# Patient Record
Sex: MALE | Race: WHITE | NOT HISPANIC OR LATINO | Employment: FULL TIME | URBAN - METROPOLITAN AREA
[De-identification: names, ages, dates, MRNs, and addresses within clinical notes are randomized per-mention and may not be internally consistent; named-entity substitution may affect disease eponyms.]

---

## 2018-08-10 ENCOUNTER — OFFICE VISIT (OUTPATIENT)
Dept: URGENT CARE | Facility: CLINIC | Age: 23
End: 2018-08-10

## 2018-08-10 VITALS
HEIGHT: 70 IN | BODY MASS INDEX: 18.64 KG/M2 | DIASTOLIC BLOOD PRESSURE: 64 MMHG | WEIGHT: 130.2 LBS | OXYGEN SATURATION: 93 % | SYSTOLIC BLOOD PRESSURE: 122 MMHG | RESPIRATION RATE: 16 BRPM | HEART RATE: 70 BPM | TEMPERATURE: 98 F

## 2018-08-10 DIAGNOSIS — S61.401A: ICD-10-CM

## 2018-08-10 PROCEDURE — 12002 RPR S/N/AX/GEN/TRNK2.6-7.5CM: CPT | Performed by: NURSE PRACTITIONER

## 2018-08-10 ASSESSMENT — ENCOUNTER SYMPTOMS
TINGLING: 0
MYALGIAS: 0
SENSORY CHANGE: 0
FOCAL WEAKNESS: 0
CHILLS: 0
FEVER: 0
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
## 2018-08-10 NOTE — PATIENT INSTRUCTIONS
Stitches, Staples, or Adhesive Wound Closure  Doctors use stitches (sutures), staples, and certain glue (skin adhesives) to hold your skin together while it heals (wound closure). You may need this treatment after you have surgery or if you cut your skin accidentally. These methods help your skin heal more quickly. They also make it less likely that you will have a scar.  What are the different kinds of wound closures?  There are many options for wound closure. The one that your doctor uses depends on how deep and large your wound is.  Adhesive Glue   To use this glue to close a wound, your doctor holds the edges of the wound together and paints the glue on the surface of your skin. You may need more than one layer of glue. Then the wound may be covered with a light bandage (dressing).  This type of skin closure may be used for small wounds that are not deep (superficial). Using glue for wound closure is less painful than other methods. It does not require a medicine that numbs the area. This method also leaves nothing to be removed. Adhesive glue is often used for children and on facial wounds.  Adhesive glue cannot be used for wounds that are deep, uneven, or bleeding. It is not used inside of a wound.  Adhesive Strips   These strips are made of sticky (adhesive), porous paper. They are placed across your skin edges like a regular adhesive bandage. You leave them on until they fall off.  Adhesive strips may be used to close very superficial wounds. They may also be used along with sutures to improve closure of your skin edges.  Sutures   Sutures are the oldest method of wound closure. Sutures can be made from natural or synthetic materials. They can be made from a material that your body can break down as your wound heals (absorbable), or they can be made from a material that needs to be removed from your skin (nonabsorbable). They come in many different strengths and sizes.  Your doctor attaches the sutures to a  steel needle on one end. Sutures can be passed through your skin, or through the tissues beneath your skin. Then they are tied and cut. Your skin edges may be closed in one continuous stitch or in separate stitches.  Sutures are strong and can be used for all kinds of wounds. Absorbable sutures may be used to close tissues under the skin. The disadvantage of sutures is that they may cause skin reactions that lead to infection. Nonabsorbable sutures need to be removed.  Staples   When surgical staples are used to close a wound, the edges of your skin on both sides of the wound are brought close together. A staple is placed across the wound, and an instrument secures the edges together. Staples are often used to close surgical cuts (incisions).  Staples are faster to use than sutures, and they cause less reaction from your skin. Staples need to be removed using a tool that bends the staples away from your skin.  How do I care for my wound closure?  · Take medicines only as told by your doctor.  · If you were prescribed an antibiotic medicine for your wound, finish it all even if you start to feel better.  · Use ointments or creams only as told by your doctor.  · Wash your hands with soap and water before and after touching your wound.  · Do not soak your wound in water. Do not take baths, swim, or use a hot tub until your doctor says it is okay.  · Ask your doctor when you can start showering. Cover your wound if told by your doctor.  · Do not take out your own sutures or staples.  · Do not pick at your wound. Picking can cause an infection.  · Keep all follow-up visits as told by your doctor. This is important.  How long will I have my wound closure?  · Leave adhesive glue on your skin until the glue peels away.  · Leave adhesive strips on your skin until they fall off.  · Absorbable sutures will dissolve within several days.  · Nonabsorbable sutures and staples must be removed. The location of the wound will  determine how long they stay in. This can range from several days to a couple of weeks.  When should I seek help for my wound closure?  Contact your doctor if:  · You have a fever.  · You have chills.  · You have redness, puffiness (swelling), or pain at the site of your wound.  · You have fluid, blood, or pus coming from your wound.  · There is a bad smell coming from your wound.  · The skin edges of your wound start to separate after your sutures have been removed.  · Your wound becomes thick, raised, and darker in color after your sutures come out (scarring).  This information is not intended to replace advice given to you by your health care provider. Make sure you discuss any questions you have with your health care provider.  Document Released: 10/15/2010 Document Revised: 08/16/2017 Document Reviewed: 05/27/2015  Elsevier Interactive Patient Education © 2017 [employer]

## 2018-08-24 ENCOUNTER — OFFICE VISIT (OUTPATIENT)
Dept: URGENT CARE | Facility: CLINIC | Age: 23
End: 2018-08-24

## 2018-08-24 VITALS
TEMPERATURE: 98.6 F | OXYGEN SATURATION: 98 % | DIASTOLIC BLOOD PRESSURE: 76 MMHG | HEART RATE: 55 BPM | BODY MASS INDEX: 18.61 KG/M2 | HEIGHT: 70 IN | WEIGHT: 130 LBS | SYSTOLIC BLOOD PRESSURE: 122 MMHG

## 2018-08-24 DIAGNOSIS — Z48.02 VISIT FOR SUTURE REMOVAL: ICD-10-CM

## 2018-08-24 PROCEDURE — 99024 POSTOP FOLLOW-UP VISIT: CPT | Performed by: PHYSICIAN ASSISTANT

## 2018-08-24 ASSESSMENT — ENCOUNTER SYMPTOMS
ABDOMINAL PAIN: 0
ROS SKIN COMMENTS: + LACERATION
CHILLS: 0
DIZZINESS: 0
VOMITING: 0
MUSCULOSKELETAL NEGATIVE: 1
DIARRHEA: 0
NAUSEA: 0
FEVER: 0
SHORTNESS OF BREATH: 0

## 2018-08-25 NOTE — PROGRESS NOTES
"Subjective:      Gilbert Lamb is a 23 y.o. male who presents with Suture / Staple Removal (right hand)            Suture / Staple Removal   The sutures were placed 11 to 14 days ago (14 days). He tried regular soap and water washings since the wound repair. The treatment provided moderate relief. The maximum temperature noted was less than 100.4 F. There has been no drainage from the wound. There is no redness present. There is no swelling present. There is no pain present.       Review of Systems   Constitutional: Negative for chills and fever.   HENT: Negative for congestion.    Respiratory: Negative for shortness of breath.    Cardiovascular: Negative for chest pain.   Gastrointestinal: Negative for abdominal pain, diarrhea, nausea and vomiting.   Genitourinary: Negative.    Musculoskeletal: Negative.    Skin: Negative for rash.        + laceration   Neurological: Negative for dizziness.        Objective:     /76   Pulse (!) 55   Temp 37 °C (98.6 °F)   Ht 1.778 m (5' 10\")   Wt 59 kg (130 lb)   SpO2 98%   BMI 18.65 kg/m²      Physical Exam   Constitutional: He is oriented to person, place, and time. He appears well-developed and well-nourished. No distress.   HENT:   Head: Normocephalic and atraumatic.   Eyes: Pupils are equal, round, and reactive to light.   Neck: Normal range of motion.   Cardiovascular: Normal rate.    Pulmonary/Chest: Effort normal.   Musculoskeletal: Normal range of motion.   Neurological: He is alert and oriented to person, place, and time.   Skin: Skin is warm and dry. He is not diaphoretic.   Linear laceration noted on left palm with 6 interrupted sutures in place. No erythema, edema or drainage of the site.    Psychiatric: He has a normal mood and affect. His behavior is normal.   Nursing note and vitals reviewed.         PMH:  has no past medical history on file.  MEDS:   Current Outpatient Prescriptions:   •  cephALEXin (KEFLEX) 500 MG CAPS, Take 1 Cap by " mouth 4 times a day., Disp: 40 Cap, Rfl: 0  •  hydrocodone-acetaminophen (NORCO) 5-325 MG TABS per tablet, Take 1-2 Tabs by mouth every 6 hours as needed., Disp: 15 Each, Rfl: 0  ALLERGIES: No Known Allergies  SURGHX: History reviewed. No pertinent surgical history.  SOCHX:  reports that he has never smoked. He has never used smokeless tobacco.  FH: family history is not on file.       Assessment/Plan:     1. Visit for suture removal    All 6 sutures removed without evidence of infection or wound dehiscence.

## 2020-07-20 ENCOUNTER — OFFICE VISIT (OUTPATIENT)
Dept: URGENT CARE | Facility: CLINIC | Age: 25
End: 2020-07-20

## 2020-07-20 VITALS
WEIGHT: 150 LBS | BODY MASS INDEX: 21.52 KG/M2 | OXYGEN SATURATION: 97 % | HEART RATE: 60 BPM | RESPIRATION RATE: 18 BRPM | TEMPERATURE: 98.5 F | SYSTOLIC BLOOD PRESSURE: 128 MMHG | DIASTOLIC BLOOD PRESSURE: 82 MMHG

## 2020-07-20 DIAGNOSIS — R05.9 COUGH: ICD-10-CM

## 2020-07-20 PROCEDURE — 99214 OFFICE O/P EST MOD 30 MIN: CPT | Performed by: PHYSICIAN ASSISTANT

## 2020-07-20 RX ORDER — ALBUTEROL SULFATE 90 UG/1
2 AEROSOL, METERED RESPIRATORY (INHALATION) EVERY 4 HOURS PRN
Qty: 8.5 G | Refills: 0 | Status: SHIPPED | OUTPATIENT
Start: 2020-07-20

## 2020-07-20 ASSESSMENT — ENCOUNTER SYMPTOMS
FEVER: 0
CHILLS: 0
WHEEZING: 0
SPUTUM PRODUCTION: 0
PALPITATIONS: 0
COUGH: 1
SHORTNESS OF BREATH: 1
SORE THROAT: 0
HEMOPTYSIS: 0

## 2020-07-20 NOTE — PROGRESS NOTES
Subjective:   Gilbert Lamb is a 25 y.o. male who presents for Cough (x5 days dry cough)      Cough   This is a new problem. The current episode started in the past 7 days. The problem has been unchanged. The cough is non-productive. Associated symptoms include shortness of breath. Pertinent negatives include no chest pain, chills, ear pain, fever, hemoptysis, sore throat or wheezing. Nothing aggravates the symptoms. He has tried nothing for the symptoms.       Review of Systems   Constitutional: Negative for chills, fever and malaise/fatigue.   HENT: Negative for congestion, ear pain and sore throat.    Respiratory: Positive for cough and shortness of breath. Negative for hemoptysis, sputum production and wheezing.    Cardiovascular: Negative for chest pain and palpitations.   All other systems reviewed and are negative.      Medications:    • albuterol Aers  • cephALEXin Caps  • HYDROcodone-acetaminophen Tabs    Allergies: Patient has no known allergies.    Problem List: Gilbert Lamb does not have a problem list on file.    Surgical History:  No past surgical history on file.    Past Social Hx: Gilbert Lamb  reports that he is a non-smoker but has been exposed to tobacco smoke. He has never used smokeless tobacco.     Past Family Hx:  Gilbert Lamb family history is not on file.     Problem list, medications, and allergies reviewed by myself today in Epic.     Objective:     Blood Pressure 128/82 (BP Location: Left arm)   Pulse 60   Temperature 36.9 °C (98.5 °F) (Temporal)   Respiration 18   Weight 68 kg (150 lb)   Oxygen Saturation 97%   Body Mass Index 21.52 kg/m²     Physical Exam  Vitals signs reviewed.   Constitutional:       General: He is not in acute distress.     Appearance: He is well-developed. He is not ill-appearing or toxic-appearing.      Interventions: He is not intubated.  HENT:      Head: Normocephalic and atraumatic.       Right Ear: Hearing, tympanic membrane, ear canal and external ear normal.      Left Ear: Hearing, tympanic membrane, ear canal and external ear normal.      Nose: Nose normal.      Mouth/Throat:      Pharynx: Uvula midline.   Eyes:      General: Lids are normal.      Conjunctiva/sclera: Conjunctivae normal.   Neck:      Musculoskeletal: Full passive range of motion without pain, normal range of motion and neck supple.   Cardiovascular:      Rate and Rhythm: Regular rhythm.      Heart sounds: Normal heart sounds, S1 normal and S2 normal. No murmur. No friction rub. No gallop.    Pulmonary:      Effort: Pulmonary effort is normal. No tachypnea, bradypnea, accessory muscle usage or respiratory distress. He is not intubated.      Breath sounds: Normal breath sounds. No decreased breath sounds, wheezing, rhonchi or rales.   Chest:      Chest wall: No tenderness.   Musculoskeletal: Normal range of motion.   Skin:     General: Skin is warm and dry.   Neurological:      Mental Status: He is alert and oriented to person, place, and time.   Psychiatric:         Speech: Speech normal.         Behavior: Behavior normal.         Thought Content: Thought content normal.         Judgment: Judgment normal.         Assessment/Plan:     Diagnosis and associated orders:     1. Cough  albuterol 108 (90 Base) MCG/ACT Aero Soln inhalation aerosol      Comments/MDM:   Pt is a 25 yr old male who presents for evaluation of cough.  Pt states that he has had a dry cough with intermittent SOB for 5 days.  Pt denies fever.  PMH: no history of tobacco exposure, COPD, asthma , history of thrombosis. Vital signs are normal.  Pt appears well and is in no acute distress.  Lung auscultation is clear with no signs of consolidation or wheezing.  With no signs of tachycardia, tachypnea, fever, or rales likely does not have pneumonia. Pt is low risk of PE per Wells criteria and is PERC NEG.  Pt declines Covid test for financial reasons.  Treat  symptomatically at this time.         Differential diagnosis, natural history, supportive care, and indications for immediate follow-up discussed.    Advised the patient to follow-up with the primary care physician for recheck, reevaluation, and consideration of further management.    Please note that this dictation was created using voice recognition software. I have made a reasonable attempt to correct obvious errors, but I expect that there are errors of grammar and possibly content that I did not discover before finalizing the note.

## 2024-02-21 ENCOUNTER — NON-PROVIDER VISIT (OUTPATIENT)
Dept: OCCUPATIONAL MEDICINE | Facility: CLINIC | Age: 29
End: 2024-02-21

## 2024-02-21 DIAGNOSIS — Z11.1 ENCOUNTER FOR PPD TEST: ICD-10-CM

## 2024-02-21 PROCEDURE — 86580 TB INTRADERMAL TEST: CPT | Performed by: NURSE PRACTITIONER

## 2024-02-23 ENCOUNTER — NON-PROVIDER VISIT (OUTPATIENT)
Dept: OCCUPATIONAL MEDICINE | Facility: CLINIC | Age: 29
End: 2024-02-23

## 2024-02-23 LAB — TB WHEAL 3D P 5 TU DIAM: NORMAL MM

## 2024-02-29 ENCOUNTER — NON-PROVIDER VISIT (OUTPATIENT)
Dept: OCCUPATIONAL MEDICINE | Facility: CLINIC | Age: 29
End: 2024-02-29

## 2024-02-29 DIAGNOSIS — Z11.1 ENCOUNTER FOR PPD TEST: ICD-10-CM

## 2024-02-29 PROCEDURE — 86580 TB INTRADERMAL TEST: CPT | Performed by: NURSE PRACTITIONER

## 2024-03-02 ENCOUNTER — NON-PROVIDER VISIT (OUTPATIENT)
Dept: URGENT CARE | Facility: CLINIC | Age: 29
End: 2024-03-02

## 2024-03-02 LAB — TB WHEAL 3D P 5 TU DIAM: NORMAL MM

## 2024-03-02 PROCEDURE — 99999 PR NO CHARGE: CPT | Performed by: PHYSICIAN ASSISTANT

## 2024-03-02 NOTE — PROGRESS NOTES
Gilbert MarsEddie is a 28 y.o. male here for a non-provider visit for PPD reading -- Step 2 of 2.      1.  Resulted in Epic under enter/edit results? Yes   2.  TB evaluation questionnaire scanned into chart and original given to patient?Yes      3. Was induration greater than 0 mm? No.      Routed to PCP? Yes